# Patient Record
Sex: MALE | URBAN - METROPOLITAN AREA
[De-identification: names, ages, dates, MRNs, and addresses within clinical notes are randomized per-mention and may not be internally consistent; named-entity substitution may affect disease eponyms.]

---

## 2023-05-26 ENCOUNTER — OFFICE VISIT (OUTPATIENT)
Dept: URBAN - METROPOLITAN AREA CLINIC 88 | Facility: CLINIC | Age: 53
End: 2023-05-26
Payer: COMMERCIAL

## 2023-05-26 DIAGNOSIS — H25.813 COMBINED FORMS OF AGE-RELATED CATARACT, BILATERAL: ICD-10-CM

## 2023-05-26 DIAGNOSIS — H18.713 CORNEAL ECTASIA, BILATERAL: ICD-10-CM

## 2023-05-26 DIAGNOSIS — H17.89 OTHER CORNEAL SCARS AND OPACITIES: ICD-10-CM

## 2023-05-26 DIAGNOSIS — H52.213 IRREGULAR ASTIGMATISM, BILATERAL: Primary | ICD-10-CM

## 2023-05-26 DIAGNOSIS — H52.223 REGULAR ASTIGMATISM, BILATERAL: ICD-10-CM

## 2023-05-26 DIAGNOSIS — H53.023 REFRACTIVE AMBLYOPIA, BILATERAL: ICD-10-CM

## 2023-05-26 DIAGNOSIS — H11.043 PERIPHERAL PTERYGIUM, STATIONARY, BILATERAL: ICD-10-CM

## 2023-05-26 PROCEDURE — 92004 COMPRE OPH EXAM NEW PT 1/>: CPT | Performed by: OPTOMETRIST

## 2023-05-26 ASSESSMENT — VISUAL ACUITY
OD: 20/25
OS: 20/25

## 2023-05-26 ASSESSMENT — INTRAOCULAR PRESSURE
OD: 16
OS: 16

## 2023-05-26 NOTE — IMPRESSION/PLAN
Impression: Irregular astigmatism, bilateral: H52.213. Plan: Reviewed refractive prescription in detail with patient and ways glasses can be used  to improve vision. Release spectacle prescription at this time. Discussed adaptation period due pt not adapting to glasses prescribed 20 years ago.

## 2023-05-26 NOTE — IMPRESSION/PLAN
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 Plan: Discussed diagnosis with patient. Baseline Pentacam today. Discussed importance of no eye rubbing. Will start Lastacaft QD OU and art tears BID-TID OU, samples provided today. Will monitor for stability in 6 months.

## 2023-05-26 NOTE — IMPRESSION/PLAN
Impression: Peripheral pterygium, stationary, bilateral: H11.043. Plan: Patient educated to condition. Sunglasses, UV protection, and artificial tears for comfort recommended. Patient knows to RTC if any changes in appearance, redness, or irritation are experienced.

## 2023-12-01 ENCOUNTER — OFFICE VISIT (OUTPATIENT)
Dept: URBAN - METROPOLITAN AREA CLINIC 88 | Facility: CLINIC | Age: 53
End: 2023-12-01
Payer: COMMERCIAL

## 2023-12-01 DIAGNOSIS — H25.813 COMBINED FORMS OF AGE-RELATED CATARACT, BILATERAL: ICD-10-CM

## 2023-12-01 DIAGNOSIS — H18.713 CORNEAL ECTASIA, BILATERAL: Primary | ICD-10-CM

## 2023-12-01 DIAGNOSIS — H11.043 PERIPHERAL PTERYGIUM, STATIONARY, BILATERAL: ICD-10-CM

## 2023-12-01 DIAGNOSIS — H17.89 OTHER CORNEAL SCARS AND OPACITIES: ICD-10-CM

## 2023-12-01 PROCEDURE — 99213 OFFICE O/P EST LOW 20 MIN: CPT | Performed by: OPTOMETRIST

## 2023-12-01 PROCEDURE — 92025 CPTRIZED CORNEAL TOPOGRAPHY: CPT | Performed by: OPTOMETRIST

## 2023-12-01 ASSESSMENT — VISUAL ACUITY
OD: 20/25
OS: 20/30